# Patient Record
(demographics unavailable — no encounter records)

---

## 2025-04-03 NOTE — HISTORY OF PRESENT ILLNESS
[FreeTextEntry1] : CC: BPH  63 year old man, retired from [employer], c/o slow dripping urinary stream.  [facility] and told he has an enlarged prostate.   No straining, hematuria, UTI, kidney stones Endorses post void dribbling; nocturia 2-8x; sense of incomplete emptying; urgency He has been on tamsulosin for several years   PVR today 16cc PSA today   SOCIAL: not a smoker  FAMHX: grandfather had CAP; not sure what cause of death was at ~81

## 2025-04-03 NOTE — ASSESSMENT
[FreeTextEntry1] : Diagnosis:  LUTS PSA screening  Plan  PVR today 16 cc PSA today sent Continue tamsulosin    Anderson Franco MD, FACS, FRCS  of Urology [facility] Director of Laparoscopic and Robotic Surgery [facility] Director of Urology, [facility] Professor of Urology   (Office) [phone] (Cell)  [phone] [email or web address]

## 2025-07-02 NOTE — HISTORY OF PRESENT ILLNESS
[FreeTextEntry1] : Language: *** Date of First visit: 2025 Accompanied by: Wife Ari Contact info: *** Referring Provider/PCP: Dr. Reanna Monson       CC/ Problem List:   =============================================================================== FIRST VISIT: The patient is a 63 year male who first presents on 2025 for elevated PSA.  He first saw Dr. Franco on 4/3/2025 for slow dripping urinary stream. He had been seen at the [facility] and was told he had an enlarged prostate. He denied straining, hematuria, UTI, kidney stones. He did report post-void dribbling and nocturia 2-8x with sensation of incomplete emptying. He has been on Tamsulosin for several years.   He now goes to the [facility].    ------------------------------------------------------------------------------------------- INTERVAL VISITS:     ===============================================================================   PMH: HTN, DM, HLD PSH: Wrist, Shoulder, Abd, Appy, Leg, Pneumothorax POBH: (if applicable) FH: Grandfather had prostate cancer, unclear cause of death   ALL: NKDA MEDS: Losartan, Metformin, Glipizide, Tamsulosin, Rosuvastatin SOC: Denies Tob, No EtoH, no Drugs     ROS: Review of Systems is as per HPI unless otherwise denoted below     =============================================================================== DATA:   LABS:-------------------------------------------------------------------------------------------------------------------  3/5/2025: sCre 1.1, HIV negative, RPR negative, GC/CT negative, T Vag negative 2025: PSA 3.58     RADS:-------------------------------------------------------------------------------------------------------------------  2025: MRI prostate read by FAIZAN 27.6gm prostate PSAD 0.13 +BPH with median lobe PIRADS 5 lesion in left PZPM [id number] +NVB invasion, no SVI, no LAD, BL fat cont IH  Films personally reviewed. I agree with the above. Gland segmented Lesion is highly suspicious and takes up the majority of the right PZ.    PATHOLOGY/CYTOLOGY:-------------------------------------------------------------------------------------------       VOIDING STUDIES: ----------------------------------------------------------------------------------------------------  4/3/2025: PVR 16cc 2025: PVR 58cc     STONE STUDIES: (Analysis/LLSA)----------------------------------------------------------------------------------       PROCEDURES: -----------------------------------------------------------------------------------------------         ===============================================================================     PHYSICAL EXAM:  GEN: AAOx3, NAD; Habitus: normal  BARRIERS to CARE: None  PSYCH: Appropriate Behavior, Affect Congruent  HEENT: AT/NC Trachea midline. EOMI.  Lungs: No labored breathing.  NEURO: + Movement, all 4 extremities grossly intact without deficits. No tremors.  SKIN: Warm dry. No visible rashes or ulcers  GAIT: Gait normal, Stability good  =======================================================================================     ASSESSMENT and PLAN   The patient is a 63 year male with a history of the followin. PIRADS 5 prostate lesion on MRI that appears to be invading the NVB Because of the patient's clinical situation we decided to set the patient up for a Transperineal Fusion prostate biopsy. This is done in the operating room under anesthesia.  The patient understands that the risks of this procedure include bleeding (hematuria, hematospermia, blood in stool or per rectum), infection, difficulty voiding/inability to urinate, fever, and, with repeated biopsies, problems with erections.  He understands that if he can not void after the biopsy he will go home with a latham.   He was given and understands the biopsy prep:  a. Fleet's enema on the morning of your biopsy.  b. Avoid blood thinners, fish oil and supplemental Vitamin E. He can stay on baby ASA if he takes it.  c. He was given information regarding blood thinners if he is on them.    -----------------------------------------------------------------------------------------------------   LABS/TESTS Ordered: UCx   Meds Ordered:   Follow up: Surgery    -----------------------------------------------------------------------------------------------------    The total amount of time I have personally spent preparing for this visit, reviewing the patient's test results, obtaining external history, ordering tests/medications, documenting clinical information, communicating with and counseling the patient/family and/or caregiver(s), and spent face to face with the patient explaining the above was 35 minutes.  Thank you for allowing me to participate in your patient's care. Please feel free to contact me with any questions.   Sydney Sanchez MD [facility] Department of Urology   [address] [place] [id number] P: [phone]; F: [phone]